# Patient Record
(demographics unavailable — no encounter records)

---

## 2025-03-13 NOTE — PHYSICAL EXAM
[Quiet/calm] : quiet/calm [Normal] : awake and interactive [Attention Intact] : attention intact [Fidgets] : does not fidget [Well-behaved during visit] : well-behaved during visit [Oppositional] : not oppositional [Appropriate eye contact] : appropriate eye contact [Smiles responsively] : smiles responsively [Positive mood] : positive mood [Answered questions appropriately] : answered questions appropriately

## 2025-03-13 NOTE — HISTORY OF PRESENT ILLNESS
[FreeTextEntry5] : Grade/School: [employer] Program through [employer]  Classroom: Self-Contained 15:1:1 IEP: [employer] Services: Speech/Language Therapy    -Will continue her IEP until 21 years of age Private tutoring/therapy: Meeting with private therapist every 2 weeks.   [FreeTextEntry1] : School/Academics: -This school year they started "Project Search" through [email or web address] - they will be set up with 3 different 8-10-week internships and have the opportunity to interview for a fulltime job at the end of the year. They will work on building a resume, interviewing skills, navigating public transportation, and can get their 's permit.  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   Social: Overall, does well and gets along with their peers.   Activities: spending time with friends, playing video games online  Medication/Side Effects: -Shortly after decreasing their dose of Concerta to 27mg - they decided to trial off medication. They have been off stimulant medication since and doing very well.  Appetite/Diet: decreased appetite midday but eats well when the medication wears off and on weekends. Sleep: Overall sleeping ok - often stays up too late playing video games. Able to get up for school without issue most mornings. HA: None SA: None Tics: None [FreeTextEntry6] : PCP: Dr. Liv Stark Annual PE: May 2024 Allergies: NKA/NKDA Followed with Ortho/Oncology for a mass that was on her knee. Followed up 5/2024 - No further follow up needed. Recent illness, surgery, injury: None, healthy

## 2025-03-13 NOTE — HISTORY OF PRESENT ILLNESS
[FreeTextEntry5] : Grade/School: [employer] Job Program through [employer]  Classroom: Self-Contained 15:1:1 IEP: [employer] Services: Speech/Language Therapy    -Will continue her IEP until 21 years of age Private tutoring/therapy: Meeting with private therapist every 2 weeks.   [FreeTextEntry1] : School/Academics: -This school year they started "Project Search" through [employer] - they will be set up with 3 different 8-10-week internships and have the opportunity to interview for a fulltime job at the end of the year. They will work on building a resume, interviewing skills, navigating public transportation, and can get their 's permit.  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   Social: Overall, does well and gets along with their peers.   Activities: spending time with friends, playing video games online  Medication/Side Effects: -Shortly after decreasing their dose of Concerta to 27mg - they decided to trial off medication. They have been off stimulant medication since and doing very well.  Appetite/Diet: decreased appetite midday but eats well when the medication wears off and on weekends. Sleep: Overall sleeping ok - often stays up too late playing video games. Able to get up for school without issue most mornings. HA: None SA: None Tics: None [FreeTextEntry6] : PCP: Dr. Liv Stark Annual PE: May 2024 Allergies: NKA/NKDA Followed with Ortho/Oncology for a mass that was on her knee. Followed up 5/2024 - No further follow up needed. Recent illness, surgery, injury: None, healthy

## 2025-03-13 NOTE — HISTORY OF PRESENT ILLNESS
[FreeTextEntry5] : Grade/School: [employer] Search Job Program through [employer]  Classroom: Self-Contained 15:1:1 IEP: [employer] Services: Speech/Language Therapy    -Will continue her IEP until 21 years of age Private tutoring/therapy: Meeting with private therapist every 2 weeks.   [FreeTextEntry1] : School/Academics: -This school year they started "Project Search" through [employer] - they will be set up with 3 different 8-10-week internships and have the opportunity to interview for a fulltime job at the end of the year. They will work on building a resume, interviewing skills, navigating public transportation, and can get their 's permit.  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   Social: Overall, does well and gets along with their peers.   Activities: spending time with friends, playing video games online  Medication/Side Effects: -Shortly after decreasing their dose of Concerta to 27mg - they decided to trial off medication. They have been off stimulant medication since and doing very well.  Appetite/Diet: decreased appetite midday but eats well when the medication wears off and on weekends. Sleep: Overall sleeping ok - often stays up too late playing video games. Able to get up for school without issue most mornings. HA: None SA: None Tics: None [FreeTextEntry6] : PCP: Dr. Liv Stark Annual PE: May 2024 Allergies: NKA/NKDA Followed with Ortho/Oncology for a mass that was on her knee. Followed up 5/2024 - No further follow up needed. Recent illness, surgery, injury: None, healthy

## 2025-03-13 NOTE — PLAN
[FreeTextEntry3] : Continue IEP/Current Services Continue weekly private therapy sessions. Continue off ADHD medication and monitor symptoms - can restart medicine if needed. Answered parent/patient questions. Follow-up 12 months for continued monitoring as needed.  Follow-up via telephone as needed.

## 2025-03-13 NOTE — REASON FOR VISIT
[FreeTextEntry2] : Follow up for ADHD and ID monitoring and medication management. [FreeTextEntry4] : Concerta 27mg on school days (Stopped 9/2024) [FreeTextEntry1] : Cristofer and Mother [FreeTextEntry3] : August 2024

## 2025-06-11 NOTE — HISTORY OF PRESENT ILLNESS
[FreeTextEntry1] : School/Academics: -They report that the school year has continued to go well -Preparing for graduation this coming Friday! 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   Social: Overall, does well and gets along with their peers.   Activities: spending time with friends, playing video games online -They will be attending [employer] event in [place] with their partner this summer  Medication/Side Effects: -Vonnie reports that they'd like to restart their Concerta regimen. They report it has been difficulty to focus and stay on task while at work.  Appetite/Diet: decreased appetite midday but eats well when the medication wears off and on weekends. Sleep: Overall sleeping ok - often stays up too late playing video games. Able to get up for school without issue most mornings. HA: None SA: None Tics: None [FreeTextEntry5] : Grade/School: [employer] Job Program through [employer]  Classroom: Self-Contained 15:1:1 IEP: [employer] Services: Speech/Language Therapy    -Will continue her IEP until 21 years of age Private tutoring/therapy: Meeting with private therapist every 2 weeks.   [FreeTextEntry6] : PCP: Dr. Liv Stark Allergies: NKA/NKDA Followed with Ortho/Oncology for a mass that was on her knee. Followed up 5/2024 - No further follow up needed. Recent illness, surgery, injury: None, healthy

## 2025-06-11 NOTE — HISTORY OF PRESENT ILLNESS
[FreeTextEntry1] : School/Academics: -They report that the school year has continued to go well -Preparing for graduation this coming Friday! 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   Social: Overall, does well and gets along with their peers.   Activities: spending time with friends, playing video games online -They will be attending [employer] event in [place] with their partner this summer  Medication/Side Effects: -Vonnie reports that they'd like to restart their Concerta regimen. They report it has been difficulty to focus and stay on task while at work.  Appetite/Diet: decreased appetite midday but eats well when the medication wears off and on weekends. Sleep: Overall sleeping ok - often stays up too late playing video games. Able to get up for school without issue most mornings. HA: None SA: None Tics: None [FreeTextEntry5] : Grade/School: [employer] Search Job Program through [employer]  Classroom: Self-Contained 15:1:1 IEP: [employer] Services: Speech/Language Therapy    -Will continue her IEP until 21 years of age Private tutoring/therapy: Meeting with private therapist every 2 weeks.   [FreeTextEntry6] : PCP: Dr. Liv Stark Allergies: NKA/NKDA Followed with Ortho/Oncology for a mass that was on her knee. Followed up 5/2024 - No further follow up needed. Recent illness, surgery, injury: None, healthy

## 2025-06-11 NOTE — REASON FOR VISIT
[FreeTextEntry4] : Concerta 27mg on school days (Stopped 9/2024) [FreeTextEntry2] : Follow up for ADHD and ID monitoring and medication management. [FreeTextEntry1] : Rin and Father [FreeTextEntry3] : March 2025

## 2025-06-11 NOTE — PHYSICAL EXAM
[Quiet/calm] : quiet/calm [Normal] : awake and interactive [Attention Intact] : attention intact [Well-behaved during visit] : well-behaved during visit [Appropriate eye contact] : appropriate eye contact [Smiles responsively] : smiles responsively [Positive mood] : positive mood [Answered questions appropriately] : answered questions appropriately [Fidgets] : does not fidget [Oppositional] : not oppositional

## 2025-06-11 NOTE — PLAN
[FreeTextEntry3] : Continue IEP/Current Services Continue weekly private therapy sessions. Restart Concerta 27mg daily Answered parent/patient questions. Follow-up 6-8 months for continued monitoring as needed.  Follow-up via telephone as needed.

## 2025-07-22 NOTE — HISTORY OF PRESENT ILLNESS
[Father] : father [Yes] : Patient goes to dentist yearly [Normal] : normal [LMP: _____] : LMP: [unfilled] [No] : No cigarette smoke exposure [Uses safety belts/safety equipment] : uses safety belts/safety equipment  [With Teen] : teen [Sleep Concerns] : no sleep concerns [Gets depressed, anxious, or irritable/has mood swings] : does not get depressed, anxious, or irritable/has mood swings [FreeTextEntry7] : 20 YR [facility], doing well, followed by Developmental for ADHD [de-identified] : no concerns today [de-identified] : not in school [de-identified] : variety of foods [de-identified] : not active but works as a  [FreeTextEntry1] : Cardiac Checklist reviewed and discussed as needed Coordination of Care reviewed - questions/concerns discussed as needed

## 2025-07-22 NOTE — PHYSICAL EXAM
[Alert] : alert [No Acute Distress] : no acute distress [Normocephalic] : normocephalic [EOMI Bilateral] : EOMI bilateral [Clear tympanic membranes with bony landmarks and light reflex present bilaterally] : clear tympanic membranes with bony landmarks and light reflex present bilaterally  [Pink Nasal Mucosa] : pink nasal mucosa [Nonerythematous Oropharynx] : nonerythematous oropharynx [Supple, full passive range of motion] : supple, full passive range of motion [No Palpable Masses] : no palpable masses [Clear to Auscultation Bilaterally] : clear to auscultation bilaterally [Regular Rate and Rhythm] : regular rate and rhythm [Normal S1, S2 audible] : normal S1, S2 audible [No Murmurs] : no murmurs [+2 Femoral Pulses] : +2 femoral pulses [Soft] : soft [NonTender] : non tender [Non Distended] : non distended [Normoactive Bowel Sounds] : normoactive bowel sounds [No Hepatomegaly] : no hepatomegaly [No Splenomegaly] : no splenomegaly [Lavon: ____] : Lavon [unfilled] [Lavon: _____] : Lavon [unfilled] [No Abnormal Lymph Nodes Palpated] : no abnormal lymph nodes palpated [Normal Muscle Tone] : normal muscle tone [No Gait Asymmetry] : no gait asymmetry [No pain or deformities with palpation of bone, muscles, joints] : no pain or deformities with palpation of bone, muscles, joints [Straight] : straight [No Scoliosis] : no scoliosis [+2 Patella DTR] : +2 patella DTR [Cranial Nerves Grossly Intact] : cranial nerves grossly intact [No Rash or Lesions] : no rash or lesions